# Patient Record
Sex: FEMALE | Race: WHITE | Employment: OTHER | URBAN - METROPOLITAN AREA
[De-identification: names, ages, dates, MRNs, and addresses within clinical notes are randomized per-mention and may not be internally consistent; named-entity substitution may affect disease eponyms.]

---

## 2019-11-04 ENCOUNTER — OFFICE VISIT (OUTPATIENT)
Dept: URGENT CARE | Facility: URGENT CARE | Age: 69
End: 2019-11-04
Payer: MEDICARE

## 2019-11-04 VITALS
SYSTOLIC BLOOD PRESSURE: 133 MMHG | HEART RATE: 60 BPM | DIASTOLIC BLOOD PRESSURE: 74 MMHG | WEIGHT: 127.6 LBS | TEMPERATURE: 98.2 F | RESPIRATION RATE: 16 BRPM | OXYGEN SATURATION: 96 %

## 2019-11-04 DIAGNOSIS — S61.011A LACERATION OF RIGHT THUMB WITHOUT FOREIGN BODY WITHOUT DAMAGE TO NAIL, INITIAL ENCOUNTER: Primary | ICD-10-CM

## 2019-11-04 PROCEDURE — 90471 IMMUNIZATION ADMIN: CPT | Performed by: PHYSICIAN ASSISTANT

## 2019-11-04 PROCEDURE — 90715 TDAP VACCINE 7 YRS/> IM: CPT | Performed by: PHYSICIAN ASSISTANT

## 2019-11-04 PROCEDURE — 12002 RPR S/N/AX/GEN/TRNK2.6-7.5CM: CPT | Performed by: PHYSICIAN ASSISTANT

## 2019-11-04 ASSESSMENT — ENCOUNTER SYMPTOMS
EYES NEGATIVE: 1
NECK STIFFNESS: 0
ENDOCRINE NEGATIVE: 1
WEAKNESS: 0
LIGHT-HEADEDNESS: 0
HEMATOLOGIC/LYMPHATIC NEGATIVE: 1
VOMITING: 0
BACK PAIN: 0
RHINORRHEA: 0
CHILLS: 0
ARTHRALGIAS: 0
PALPITATIONS: 0
SHORTNESS OF BREATH: 0
NAUSEA: 0
JOINT SWELLING: 0
BRUISES/BLEEDS EASILY: 0
CARDIOVASCULAR NEGATIVE: 1
MUSCULOSKELETAL NEGATIVE: 1
NECK PAIN: 0
FEVER: 0
MYALGIAS: 0
RESPIRATORY NEGATIVE: 1
HEADACHES: 0
DIARRHEA: 0
ALLERGIC/IMMUNOLOGIC NEGATIVE: 1
DIZZINESS: 0
SORE THROAT: 0
COUGH: 0
WOUND: 1

## 2019-11-11 ENCOUNTER — ALLIED HEALTH/NURSE VISIT (OUTPATIENT)
Dept: NURSING | Facility: CLINIC | Age: 69
End: 2019-11-11
Payer: MEDICARE

## 2019-11-11 DIAGNOSIS — Z48.02 VISIT FOR SUTURE REMOVAL: Primary | ICD-10-CM

## 2019-11-11 NOTE — NURSING NOTE
Helen Scott presents to the clinic for removal of sutures. The patient has had sutures in place for 7 days. There has been no patient reported signs or symptoms of infection or drainage. 7  sutures are seen and located on the right thumb. Tetanus status is up to date. All sutures were easily removed today. Routine wound care discussed by the RN or provider. The patient will follow up as needed.  Beatriz Albert RN

## 2020-01-30 ENCOUNTER — OFFICE VISIT (OUTPATIENT)
Dept: URGENT CARE | Facility: URGENT CARE | Age: 70
End: 2020-01-30
Payer: MEDICARE

## 2020-01-30 VITALS
SYSTOLIC BLOOD PRESSURE: 148 MMHG | DIASTOLIC BLOOD PRESSURE: 83 MMHG | TEMPERATURE: 101.5 F | WEIGHT: 127.4 LBS | OXYGEN SATURATION: 97 % | HEART RATE: 92 BPM

## 2020-01-30 DIAGNOSIS — J11.1 INFLUENZA-LIKE ILLNESS: Primary | ICD-10-CM

## 2020-01-30 LAB
DEPRECATED S PYO AG THROAT QL EIA: NORMAL
FLUAV+FLUBV AG SPEC QL: NEGATIVE
FLUAV+FLUBV AG SPEC QL: NEGATIVE
SPECIMEN SOURCE: NORMAL
SPECIMEN SOURCE: NORMAL

## 2020-01-30 PROCEDURE — 87081 CULTURE SCREEN ONLY: CPT | Performed by: PHYSICIAN ASSISTANT

## 2020-01-30 PROCEDURE — 99213 OFFICE O/P EST LOW 20 MIN: CPT | Performed by: PHYSICIAN ASSISTANT

## 2020-01-30 PROCEDURE — 87880 STREP A ASSAY W/OPTIC: CPT | Performed by: PHYSICIAN ASSISTANT

## 2020-01-30 PROCEDURE — 87804 INFLUENZA ASSAY W/OPTIC: CPT | Performed by: PHYSICIAN ASSISTANT

## 2020-01-30 RX ORDER — OSELTAMIVIR PHOSPHATE 30 MG/1
30 CAPSULE ORAL 2 TIMES DAILY
Qty: 10 CAPSULE | Refills: 0 | Status: SHIPPED | OUTPATIENT
Start: 2020-01-30 | End: 2020-02-04

## 2020-01-30 ASSESSMENT — ENCOUNTER SYMPTOMS
FATIGUE: 1
RHINORRHEA: 1
SORE THROAT: 1
PALPITATIONS: 1
SINUS PRESSURE: 0
GASTROINTESTINAL NEGATIVE: 1
SINUS PAIN: 0
COUGH: 1
FEVER: 1
SHORTNESS OF BREATH: 0
ARTHRALGIAS: 1
WHEEZING: 0

## 2020-01-30 NOTE — LETTER
[facility]  [address]  [facility] [address]  Phone: [phone]    01/31/20    Helen Scott  [address] [place] [id number]      To whom it may concern:     The results of your recent lab results were normal. Enclosed are a copy of the results. Please call us with any questions/concerns regarding your results. Thank you for choosing [facility]. Have a great day!  Results for orders placed or performed in visit on 01/30/20   Strep, Rapid Screen     Status: None   Result Value Ref Range    Specimen Description Throat     Rapid Strep A Screen       NEGATIVE: No Group A streptococcal antigen detected by immunoassay, await culture report.   Influenza A/B antigen     Status: None   Result Value Ref Range    Influenza A/B Agn Specimen Nasal     Influenza A Negative NEG^Negative    Influenza B Negative NEG^Negative   Beta strep group A culture     Status: None   Result Value Ref Range    Specimen Description Throat     Culture Micro No beta hemolytic Streptococcus Group A isolated          Sincerely,      Bettye Winchester PA-C

## 2020-01-30 NOTE — PROGRESS NOTES
Subjective   Helen Scott is a 69 year old female who presents to clinic today for the following health issues:  HPI   RESPIRATORY SYMPTOMS    Duration: 2days    Description  nasal congestion, rhinorrhea, sore throat, cough, fever, chills, fatigue/malaise, myalgias and nausea    Severity: moderate    Accompanying signs and symptoms: Dry cough but no shortness of breath or wheezing.  No chest pain, orthopnea, PND or peripheral edema.  No HA, visual disturbances, dizziness, one sided weakness or slurred speech.  No abdominal pain, constipation, diarrhea, bloody or black tarry stools.      History (predisposing factors):  Hx of palpitations.  Possible ill contacts.  No pmh of asthma.  Non-smoker.    Precipitating or alleviating factors: None    Therapies tried and outcome:  rest and fluids OTC NSAID with minimal relief    Patient Active Problem List   Diagnosis     Palpitations     Past Surgical History:   Procedure Laterality Date     HC REMOVE TONSILS/ADENOIDS,<10 Y/O      T & A <11y.o.       Social History     Tobacco Use     Smoking status: Never Smoker     Smokeless tobacco: Never Used   Substance Use Topics     Alcohol use: Not on file     Family History   Problem Relation Age of Onset     Breast Cancer Mother         age 70s.   age 80     Respiratory Father         COPD, tob         Current Outpatient Medications   Medication Sig Dispense Refill     ATENOLOL 50 MG OR TABS 1 TABLET DAILY    due for a BP check/med chk  30 0     CALCIUM-VITAMIN D PO        Allergies   Allergen Reactions     No Known Drug Allergies      Reviewed and updated as needed this visit by Provider       Review of Systems   Constitutional: Positive for fatigue and fever.   HENT: Positive for congestion, rhinorrhea and sore throat. Negative for ear discharge, ear pain, hearing loss, sinus pressure and sinus pain.    Respiratory: Positive for cough. Negative for shortness of breath and wheezing.    Cardiovascular: Positive for  palpitations. Negative for chest pain and peripheral edema.   Gastrointestinal: Negative.    Musculoskeletal: Positive for arthralgias.   All other systems reviewed and are negative.           Objective    BP (!) 148/83 (BP Location: Left arm, Patient Position: Chair, Cuff Size: Adult Regular)   Pulse 92   Temp 101.5  F (38.6  C) (Oral)   Wt 57.8 kg (127 lb 6.4 oz)   SpO2 97%   There is no height or weight on file to calculate BMI.  Physical Exam  Vitals signs and nursing note reviewed.   Constitutional:       General: She is not in acute distress.     Appearance: Normal appearance. She is well-developed and normal weight. She is not ill-appearing.   HENT:      Head: Normocephalic and atraumatic.      Comments: TMs are intact without any erythema or bulging bilaterally.  Airway is patent.     Nose: Nose normal.      Mouth/Throat:      Lips: Pink.      Mouth: Mucous membranes are moist.      Pharynx: Oropharynx is clear. Uvula midline. No pharyngeal swelling, oropharyngeal exudate or posterior oropharyngeal erythema.      Tonsils: No tonsillar exudate.   Eyes:      General: No scleral icterus.     Extraocular Movements: Extraocular movements intact.      Conjunctiva/sclera: Conjunctivae normal.      Pupils: Pupils are equal, round, and reactive to light.   Neck:      Musculoskeletal: Normal range of motion and neck supple.      Thyroid: No thyromegaly.   Cardiovascular:      Rate and Rhythm: Normal rate and regular rhythm.      Pulses: Normal pulses.      Heart sounds: Normal heart sounds, S1 normal and S2 normal. No murmur. No friction rub. No gallop.    Pulmonary:      Effort: Pulmonary effort is normal. No accessory muscle usage, respiratory distress or retractions.      Breath sounds: Normal breath sounds and air entry. No stridor. No decreased breath sounds, wheezing, rhonchi or rales.   Lymphadenopathy:      Cervical: No cervical adenopathy.   Skin:     General: Skin is warm and dry.      Nails: There is no  clubbing.     Neurological:      Mental Status: She is alert and oriented to person, place, and time.   Psychiatric:         Mood and Affect: Mood normal.         Behavior: Behavior normal.         Thought Content: Thought content normal.         Judgment: Judgment normal.     Diagnostic Test Results:  Labs reviewed in Epic  Results for orders placed or performed in visit on 01/30/20 (from the past 24 hour(s))   Strep, Rapid Screen   Result Value Ref Range    Specimen Description Throat     Rapid Strep A Screen       NEGATIVE: No Group A streptococcal antigen detected by immunoassay, await culture report.   Influenza A/B antigen   Result Value Ref Range    Influenza A/B Agn Specimen Nasal     Influenza A Negative NEG^Negative    Influenza B Negative NEG^Negative             Assessment & Plan   Influenza-like illness:  Rapid influenza and RST were negative, will send for throat culture.  Will empirically treat with [id number] based on H&P.  Recommend tylenol/ibuprofen prn pain/fever.  She is considered contagious until he has been fever free for at least 24hours without the use of antipyretics.  Cover coughs, sneezes and wash hands.  Rest, fluids, chicken soup.  Recheck in clinic if symptoms worsen or if symptoms do not improve.  To the ER  she develops hemoptysis, shortness of breath/wheezing, chest pain, stiff neck or fevers >102.  -     Strep, Rapid Screen  -     Influenza A/B antigen  -     oseltamivir (TAMIFLU) 30 MG capsule; Take 1 capsule (30 mg) by mouth 2 times daily for 5 days adjusted for renal impairment  -     Beta strep group A culture        Bettye Winchester PA-C  [facility]

## 2020-01-31 LAB
BACTERIA SPEC CULT: NORMAL
SPECIMEN SOURCE: NORMAL
